# Patient Record
Sex: MALE | Race: WHITE | URBAN - METROPOLITAN AREA
[De-identification: names, ages, dates, MRNs, and addresses within clinical notes are randomized per-mention and may not be internally consistent; named-entity substitution may affect disease eponyms.]

---

## 2017-04-19 ENCOUNTER — OFFICE VISIT (OUTPATIENT)
Dept: ORTHOPEDIC SURGERY | Age: 62
End: 2017-04-19

## 2017-04-19 VITALS — BODY MASS INDEX: 28.44 KG/M2 | WEIGHT: 210 LBS | HEIGHT: 72 IN

## 2017-04-19 DIAGNOSIS — M75.02 ADHESIVE CAPSULITIS OF LEFT SHOULDER: ICD-10-CM

## 2017-04-19 DIAGNOSIS — M25.512 PAIN OF LEFT SHOULDER REGION: Primary | ICD-10-CM

## 2017-04-19 PROCEDURE — 99214 OFFICE O/P EST MOD 30 MIN: CPT | Performed by: ORTHOPAEDIC SURGERY

## 2017-04-19 PROCEDURE — 73030 X-RAY EXAM OF SHOULDER: CPT | Performed by: ORTHOPAEDIC SURGERY

## 2017-05-17 ENCOUNTER — OFFICE VISIT (OUTPATIENT)
Dept: ORTHOPEDIC SURGERY | Age: 62
End: 2017-05-17

## 2017-05-17 VITALS — HEIGHT: 72 IN | BODY MASS INDEX: 28.44 KG/M2 | WEIGHT: 210 LBS

## 2017-05-17 DIAGNOSIS — M75.02 ADHESIVE CAPSULITIS OF LEFT SHOULDER: Primary | ICD-10-CM

## 2017-05-17 PROCEDURE — 20610 DRAIN/INJ JOINT/BURSA W/O US: CPT | Performed by: PHYSICIAN ASSISTANT

## 2017-05-17 PROCEDURE — 99213 OFFICE O/P EST LOW 20 MIN: CPT | Performed by: PHYSICIAN ASSISTANT

## 2017-07-10 ENCOUNTER — OFFICE VISIT (OUTPATIENT)
Dept: ORTHOPEDIC SURGERY | Age: 62
End: 2017-07-10

## 2017-07-10 VITALS
BODY MASS INDEX: 29.12 KG/M2 | WEIGHT: 215 LBS | DIASTOLIC BLOOD PRESSURE: 69 MMHG | HEIGHT: 72 IN | SYSTOLIC BLOOD PRESSURE: 140 MMHG

## 2017-07-10 DIAGNOSIS — M25.561 RIGHT KNEE PAIN, UNSPECIFIED CHRONICITY: Primary | ICD-10-CM

## 2017-07-10 DIAGNOSIS — S83.91XA SPRAIN OF RIGHT KNEE, UNSPECIFIED LIGAMENT, INITIAL ENCOUNTER: ICD-10-CM

## 2017-07-10 DIAGNOSIS — S93.411A SPRAIN OF CALCANEOFIBULAR LIGAMENT OF RIGHT ANKLE, INITIAL ENCOUNTER: ICD-10-CM

## 2017-07-10 DIAGNOSIS — M17.11 ARTHRITIS OF RIGHT KNEE: ICD-10-CM

## 2017-07-10 DIAGNOSIS — M25.571 RIGHT ANKLE PAIN, UNSPECIFIED CHRONICITY: ICD-10-CM

## 2017-07-10 PROCEDURE — L1812 KO ELASTIC W/JOINTS PRE OTS: HCPCS | Performed by: PHYSICIAN ASSISTANT

## 2017-07-10 PROCEDURE — L1902 AFO ANKLE GAUNTLET PRE OTS: HCPCS | Performed by: PHYSICIAN ASSISTANT

## 2017-07-10 PROCEDURE — 73610 X-RAY EXAM OF ANKLE: CPT | Performed by: PHYSICIAN ASSISTANT

## 2017-07-10 PROCEDURE — 99214 OFFICE O/P EST MOD 30 MIN: CPT | Performed by: PHYSICIAN ASSISTANT

## 2017-07-10 PROCEDURE — 73564 X-RAY EXAM KNEE 4 OR MORE: CPT | Performed by: PHYSICIAN ASSISTANT

## 2017-07-21 ENCOUNTER — OFFICE VISIT (OUTPATIENT)
Dept: ORTHOPEDIC SURGERY | Age: 62
End: 2017-07-21

## 2017-07-21 ENCOUNTER — TELEPHONE (OUTPATIENT)
Dept: ORTHOPEDIC SURGERY | Age: 62
End: 2017-07-21

## 2017-07-21 VITALS
WEIGHT: 214.95 LBS | SYSTOLIC BLOOD PRESSURE: 121 MMHG | HEIGHT: 72 IN | DIASTOLIC BLOOD PRESSURE: 65 MMHG | BODY MASS INDEX: 29.11 KG/M2 | HEART RATE: 69 BPM

## 2017-07-21 DIAGNOSIS — M17.11 PRIMARY OSTEOARTHRITIS OF RIGHT KNEE: Primary | ICD-10-CM

## 2017-07-21 DIAGNOSIS — S83.241A TEAR OF MEDIAL MENISCUS OF RIGHT KNEE, CURRENT, UNSPECIFIED TEAR TYPE, INITIAL ENCOUNTER: ICD-10-CM

## 2017-07-21 DIAGNOSIS — S93.411D SPRAIN OF CALCANEOFIBULAR LIGAMENT OF RIGHT ANKLE, SUBSEQUENT ENCOUNTER: ICD-10-CM

## 2017-07-21 PROCEDURE — 99214 OFFICE O/P EST MOD 30 MIN: CPT | Performed by: ORTHOPAEDIC SURGERY

## 2017-07-21 PROCEDURE — L4361 PNEUMA/VAC WALK BOOT PRE OTS: HCPCS | Performed by: ORTHOPAEDIC SURGERY

## 2017-07-24 ENCOUNTER — TELEPHONE (OUTPATIENT)
Dept: ORTHOPEDIC SURGERY | Age: 62
End: 2017-07-24

## 2017-07-26 ENCOUNTER — OFFICE VISIT (OUTPATIENT)
Dept: ORTHOPEDIC SURGERY | Age: 62
End: 2017-07-26

## 2017-07-26 VITALS — HEIGHT: 72 IN | BODY MASS INDEX: 29.11 KG/M2 | WEIGHT: 214.95 LBS

## 2017-07-26 DIAGNOSIS — M17.11 PRIMARY OSTEOARTHRITIS OF RIGHT KNEE: Primary | ICD-10-CM

## 2017-07-26 DIAGNOSIS — S83.91XA SPRAIN OF RIGHT KNEE, UNSPECIFIED LIGAMENT, INITIAL ENCOUNTER: ICD-10-CM

## 2017-07-26 DIAGNOSIS — S93.421A SPRAIN, ANKLE JOINT, MEDIAL, RIGHT, INITIAL ENCOUNTER: ICD-10-CM

## 2017-07-26 PROBLEM — S93.429A SPRAIN, ANKLE JOINT, MEDIAL: Status: ACTIVE | Noted: 2017-07-26

## 2017-07-26 PROCEDURE — 99214 OFFICE O/P EST MOD 30 MIN: CPT | Performed by: PHYSICIAN ASSISTANT

## 2017-12-14 ENCOUNTER — TELEPHONE (OUTPATIENT)
Dept: ORTHOPEDIC SURGERY | Age: 62
End: 2017-12-14

## 2017-12-14 ENCOUNTER — OFFICE VISIT (OUTPATIENT)
Dept: ORTHOPEDIC SURGERY | Age: 62
End: 2017-12-14

## 2017-12-14 VITALS — BODY MASS INDEX: 30.77 KG/M2 | HEIGHT: 70 IN | WEIGHT: 214.95 LBS

## 2017-12-14 DIAGNOSIS — S54.12XA MEDIAN NERVE LACERATION, LEFT, INITIAL ENCOUNTER: ICD-10-CM

## 2017-12-14 DIAGNOSIS — S61.502A FLEXOR TENDON LACERATION, WRIST, OPEN WOUND, LEFT, INITIAL ENCOUNTER: Primary | ICD-10-CM

## 2017-12-14 DIAGNOSIS — S66.922A FLEXOR TENDON LACERATION, WRIST, OPEN WOUND, LEFT, INITIAL ENCOUNTER: Primary | ICD-10-CM

## 2017-12-14 DIAGNOSIS — Z01.810 PREOP CARDIOVASCULAR EXAM: Primary | ICD-10-CM

## 2017-12-14 PROCEDURE — 99215 OFFICE O/P EST HI 40 MIN: CPT | Performed by: ORTHOPAEDIC SURGERY

## 2017-12-14 NOTE — PROGRESS NOTES
Assessment: Probable partial median nerve laceration. Transection of the palmar cutaneous branch of the median nerve. Very high likelihood of at least partial flexor carpi radialis transection. Clinical exam would also suggest complete transection of flexor digitorum superficialis to the index finger. Treatment Plan: The patient and his wife are fairly apprehensive about undergoing surgery but I have discussed with them that my best recommendation would be that we schedule him for surgery for tomorrow under general anesthesia and reopen his incision to better explore the wound. We discussed risk and benefits of tendon repair as well as median nerve repair. I made sure that they understood that connecting the nerve fibers back together is not going to give him immediate return of sensation. I also tried to discuss with them the rehabilitation necessary after tendon injuries but I'm not sure that they fully understand this at the current time as they were both very apprehensive about whether or not we should go through with surgery. I have strongly encouraged him however to reexplore this wound given the physical findings    Addendum: After an extremely long conversation on multiple occasions with the patient and his wife I think they have decided not to schedule surgery currently. No Follow-up on file. Chief Complaint:  Wrist Pain (12/12/17 tape gun snapped back and hit his left wrist)      History of Present Illness  Mal Herbert is a 60 y.o. male. Patient is a gentleman who 2 days ago was using a tape gun when the tape broke and the serrated middle portion of the tape gun lacerated the volar surface of his left wrist.  Since that time he has had numbness in his thumb index and part of his middle finger. He also has complete numbness in the palm of his hand and has a burning pain in this location.   He states whenever he tries to  things with the index or middle fingers he gets a radial arterial pulses difficult to assess due to the laceration   Neurologic Exam: 2 point discrimination is completely absent and he cannot feel me touch the pulp space of the thumb either radially or ulnarly. 2 point discrimination is absent in the radial aspect of the index finger is 10 mm in the ulnar aspect of the index finger it is 7 mm in the radial aspect of the middle finger and 5 mm in the ulnar aspect of the middle finger and remaining digits. Intrinsic Muscle Strength:  Normal but difficult to assess APB function secondary to the laceration and pain  Extrinsic Muscle Strength: Normal  Special Tests:           Additional Comments:     Additional Examinations:  X-Ray Findings:    Additional Diagnostic Test Findings:    Office Procedures:    No orders of the defined types were placed in this encounter.

## 2017-12-15 LAB — GLUCOSE BLD-MCNC: 141 MG/DL (ref 70–99)

## 2017-12-18 ENCOUNTER — HOSPITAL ENCOUNTER (OUTPATIENT)
Dept: OCCUPATIONAL THERAPY | Age: 62
Discharge: HOME | End: 2017-12-31
Admitting: ORTHOPAEDIC SURGERY

## 2017-12-18 DIAGNOSIS — S54.12XD LACERATION OF LEFT MEDIAN NERVE, SUBSEQUENT ENCOUNTER: ICD-10-CM

## 2017-12-18 DIAGNOSIS — S61.512D LACERATION OF LEFT WRIST WITH TENDON INVOLVEMENT, SUBSEQUENT ENCOUNTER: Primary | ICD-10-CM

## 2017-12-18 DIAGNOSIS — S66.922D LACERATION OF LEFT WRIST WITH TENDON INVOLVEMENT, SUBSEQUENT ENCOUNTER: Primary | ICD-10-CM

## 2017-12-18 NOTE — PLAN OF CARE
in cm Index: 6  Lon  Rin  Small:5   Thumb ROM 50% flex of both joints   Thumb opposition  R:  L:not assessed   Thumb Radial/Palmar abd ROM R:  L:   Wrist ROM Ext/Flex R:  L:not assessed   Edema in cm circumf. MCPJs Moderate edema in hand and fingers   Edema in cm circumf. Wrist R:  L:    strength in lbs R:  L:   Pinch Strengthin lbs: lat  R:  L:   Pinch Strength in lbs:  3 point R:  L:     MMT:       Observations (including splints, bandages, incisions, scars): took down post op dressing, no signs of complications, sutures intact    Sensation:  [] No reported deficits  [] Intact to light touch    [] [place] Gina test completed, findings as noted:  [x] Other:thumb and index, palm numb, did report some sensation to middle and radial half of ring finger. Palpation: not tested    Functional Mobility/Transfers/Gait:  [x] Independent - no significant gait deviations  [] Assistance needed   [] Assistive device used: Falls Risk Assessment (30 days):   [x] Falls Risk assessed and no intervention required. [] Falls Risk assessed and Patient requires intervention due to being higher risk   TUG score (>12s at risk):     [] Falls education provided, including      Review Of Systems (ROS): [x]Performed Review of systems (Integumentary, CardioPulmonary, Neurological) by intake and observation. Intake form has been scanned into medical record. Patient has been instructed to contact their primary care physician regarding ROS issues if not already being addressed at this time. ASSESSMENT:   This patient presents with signs and symptoms consistent with the medical diagnosis provided by the referring physician.      Impairments (physical, cognitive and/or psychosocial):  [x] Decreased mobility   [x] Weakness    [] Hypersensitivity   [x] Pain/tenderness   [x] Edema/swelling   [x] Decreased coordination (fine/gross motor)   [] Impaired body mechanics  [x] Sensory loss  [] Loss of balance   [] Other:      Performance Deficits (to be addressed in plan of care):   [x] Bathing    [x] Household Tasks (cooking/cleaning)   [x] Dressing    [] Self Feeding   [x] Grooming    [x] Work/Education   [] Functional Mobility   [] Sleeping/Rest   [] Toileting/Hygiene   [x] Recreational Activities   [] Driving    [] Community/Social Participation   [] Other:     Rehab Potential:   [] Excellent [x] Good [] Fair  [] Poor     Barriers affecting rehab potential:  []Age    []Lack of Motivation   []Co-Morbidities  []Cognitive Function  []Environmental/home/work barriers  []Other: Tolerance of evaluation/treatment:    [] Excellent [x] Good [] Fair  [] Poor    PLAN OF CARE:  Interventions:   [x] Therapeutic Exercise [x] Therapeutic Activity    [x] Activities of Daily Living [x] Neuromuscular Re-education      [x] Patient Education  [x] Manual Therapy      [x] Modalities as needed, and not otherwise contraindicated, including: ultrasound,paraffin,moist heat/cold pack, electrical stimulation, contrast bath, iontophoresis  [] Splinting    Frequency/Duration:  3 days per week for 8 weeks    GOALS:  Short Term Goals: To be achieved in: 2 weeks  1. Independent in HEP and progression per patient tolerance, in order to prevent re-injury. 2. Patient will have a decrease in pain to facilitate improvement in movement, function, and ADLs as indicated by Functional Deficits. Long Term Goals to be achieved in 8  weeks, including patient directed goals to address identified performance deficits:  1) Pt to be independent in graded HEP progression with a good level of effort and compliance. 2) Pt to report a score of 30 % or less on the Quick DASH disability questionnaire for increased performance with carrying, moving, and handling objects. 3) Pt will demonstrate increased ROM to fingertips touching palm, thumb flex to small finger for improved ability to return to work at [employer] [place] [employer], complete desired self care.   4) Pt will demonstrate

## 2017-12-20 ENCOUNTER — HOSPITAL ENCOUNTER (OUTPATIENT)
Dept: OCCUPATIONAL THERAPY | Age: 62
Discharge: HOME | End: 2017-12-20
Admitting: ORTHOPAEDIC SURGERY

## 2017-12-20 ENCOUNTER — OFFICE VISIT (OUTPATIENT)
Dept: ORTHOPEDIC SURGERY | Age: 62
End: 2017-12-20

## 2017-12-20 DIAGNOSIS — S66.922S: ICD-10-CM

## 2017-12-20 DIAGNOSIS — S61.502S: ICD-10-CM

## 2017-12-20 DIAGNOSIS — S54.12XD LACERATION OF LEFT MEDIAN NERVE, SUBSEQUENT ENCOUNTER: Primary | ICD-10-CM

## 2017-12-20 PROBLEM — S61.502A FLEXOR TENDON LACERATION OF LEFT WRIST WITH OPEN WOUND: Status: ACTIVE | Noted: 2017-12-20

## 2017-12-20 PROBLEM — S66.922A FLEXOR TENDON LACERATION OF LEFT WRIST WITH OPEN WOUND: Status: ACTIVE | Noted: 2017-12-20

## 2017-12-20 PROCEDURE — 99024 POSTOP FOLLOW-UP VISIT: CPT | Performed by: ORTHOPAEDIC SURGERY

## 2017-12-20 RX ORDER — OXYCODONE HYDROCHLORIDE AND ACETAMINOPHEN 5; 325 MG/1; MG/1
1 TABLET ORAL EVERY 4 HOURS PRN
Qty: 40 TABLET | Refills: 0 | Status: SHIPPED | OUTPATIENT
Start: 2017-12-20 | End: 2017-12-27

## 2017-12-20 NOTE — FLOWSHEET NOTE
radial, volar wrist. Lacerated 40% of median N, FDS to index, FPL thumb. Observations (including splints, bandages, incisions, scars): took down post op dressing, no signs of complications, sutures intact      OBJECTIVE:   Date:  Hand Dominance:     [x]  Right    [] Left 2017    Objective Measures:      PAIN 4/10    Quick DASH 75%    Digit  ROM         Index    MP:  PIP:  DIP:                               Long MP:  PIP:  DIP                               Ring MP:  PIP:  DIP                               Small MP:  PIP:  DIP    Digits tip to DPFC in cm Index: 6  Lon  Rin  Small:5    Thumb ROM 50% flex of both joints    Thumb opposition  R:  L:not assessed    Thumb Radial/Palmar abd ROM R:  L:    Wrist ROM Ext/Flex R:  L:not assessed    Edema in cm circumf. MCPJs Moderate edema in hand and fingers    Edema in cm circumf. Wrist R:  L:     strength in lbs R:  L:    Pinch Strengthin lbs: lat  R:  L:    Pinch Strength in lbs:  3 point R:  L:    MMT:                 Modalities: 17    HP - Neutral forearm, wrist flexed        Therapeutic Exercise & Activities:     HEP Short range thumb and digit flex, ext only to orthosis    Prom into digit, thumb flex  5'   AROM  Short arc, gentle flex fingers, thumb   Place and hold  Gentle hold, 3x (moves well index)                                   Therapeutic Exercise and NMR EXR  [x] (44319) Provided verbal/tactile cueing for activities related to strengthening, flexibility, endurance, ROM  for improvements in scapular, scapulothoracic and UE control with self care, reaching, carrying, lifting, house/yardwork, driving/computer work. [x] (80373) Provided verbal/tactile cueing for activities related to improving balance, coordination, kinesthetic sense, posture, motor skill, proprioception  to assist with  scapular, scapulothoracic and UE control with self care, reaching, carrying, lifting, house/yardwork, driving/computer work.     Therapeutic Activities:    [] ( 98415) therapeutic activities, direct (one on one) patient contact. Use of dynamic activities to improve functional performance. Activities of Daily Living:  [] (33731) Provided self-care/home management training (i.e., activities of daily living and compensatory training, meal preparation, safety procedures, and instructions in use of assistive technology devices/adaptive equipment)     Home Exercise Program:    [] (25562) Reviewed/Progressed HEP activities related to strengthening, flexibility, endurance, ROM of scapular, scapulothoracic and UE control with self care, reaching, carrying, lifting, house/yardwork, driving/computer work    Manual Treatments:   [] (83050) Provided manual therapy to mobilize soft tissue/joints of the UE for the purpose of modulating pain, promoting relaxation,  increasing ROM, reducing/eliminating soft tissue swelling/inflammation/restriction, improving soft tissue extensibility and allowing for proper ROM for normal function with self care, reaching, carrying, lifting, house/yardwork, driving/computer work    Splinting:  [] Fabrication of: L-code  [] (98650) Checkout for orthotic/prosthetic use, established patient   [] (62740) Orthotic management and training (fitting and assessment)  [x] Comments:12/20/17  Adjusted orthosis    Charges:  Timed Code Treatment Minutes: 25   Total Treatment Minutes: 40   [x] EVAL (LOW) 02161   [] OT Re-eval (35631)  [] EVAL (MOD) 17965   [] EVAL (HIGH) 77983       [x] Daniel (95230) x  1   [] QNGBU(68247)  [x] NMR (65411) x  1   [x] Estim (attended) (09391)   [] Manual ([id number]) x       [] US (51171)  [] TA (76215) x      [] Paraffin (76077)  [] ADL  (02518) x     [] Splint/L code: Dorsal block    [] Estim (unattended) (01092)  [] Other:  [](68373) Checkout for orthotic use, established patient x       [] (85298) Orthotic mgmt & training  x        GOALS:Short Term Goals: To be achieved in: 2 weeks  1.  Independent in HEP and progression per patient tolerance, in order to prevent re-injury. 2. Patient will have a decrease in pain to facilitate improvement in movement, function, and ADLs as indicated by Functional Deficits.     Long Term Goals to be achieved in 8  weeks, including patient directed goals to address identified performance deficits:  1) Pt to be independent in graded HEP progression with a good level of effort and compliance. 2) Pt to report a score of 30 % or less on the Quick DASH disability questionnaire for increased performance with carrying, moving, and handling objects. 3) Pt will demonstrate increased ROM to fingertips touching palm, thumb flex to small finger for improved ability to return to work at [employer], complete desired self care. 4) Pt will demonstrate increased strength to  50% of right for improved ability to  golf club, resume job duties. 5) Pt will have a decrease in pain to 2/10 to facilitate improvement in ability to return to work at [employer], complete desired self care.       Progression Towards Functional goals:  [x] Patient is progressing as expected towards functional goals listed. [] Progression is slowed due to complexities listed. [] Progression has been slowed due to co-morbidities. [] Plan just implemented, too soon to assess goals progression  [] Other:     ASSESSMENT:  Very good digit and thumb flex, cautioned patient to hold back on active gripping    Treatment/Activity Tolerance:  [] Patient tolerated treatment well [] Patient limited by fatique  [] Patient limited by pain  [] Patient limited by other medical complications  [] Other:     Prognosis: [x] Good [] Fair  [] Poor    Patient Requires Follow-up: [x] Yes  [] No    PLAN: Recommend Occupational Therapy 3 times a week for 8 weeks  [x] Continue per plan of care [] Alter current plan (see comments)  [] Plan of care initiated [] Hold pending MD visit [] Discharge    Plan for next session: adjust orthosis as needed. , tendon repair protocol, early active motion.  Pain control   Thermal modalities, functional activities and strengthening as inicated, AROM, PROM as indicated    Electronically signed by: Nicolasa Harden OTR/L, [address] [facility] [id number]

## 2017-12-20 NOTE — PROGRESS NOTES
Assessment: Excellent early result from repair of flexor pollicis longus. Flexor digitorum superficialis to the index finger. Flexor carpi radialis. Partial median nerve laceration. And ligation of the radial artery    Treatment Plan: Patient has begun early active range of motion therapy. I instructed him on continuing with his therapy. It is acceptable to go ahead and remove his sutures and his therapy visit next Tuesday. I will follow-up in 3 weeks unless there are any problems in the interim    Return in about 3 weeks (around 1/10/2018). History of Present Illness  Jaja Palmer is a 60 y.o. male. 1st postop visit    Review of Systems  Complete Review of Systems performed and is non-contributory except for what is noted in HPI. Vital Signs  There were no vitals filed for this visit. There is no height or weight on file to calculate BMI. Physical Exam  Constitutional:  Patient is well-nourished and demonstrates normal hygiene. Mental Status:  Patient is alert and oriented to person, place and time. Skin:  Intact, no rashes or lesions. Hand Examination: Wounds are healing nicely good pull-through of the repaired tendons. Patient is a dorsal extension block splint    Additional Comments:     Additional Examinations:  X-Ray Findings:    Additional Diagnostic Test Findings:    Office Procedures:    No orders of the defined types were placed in this encounter.

## 2017-12-20 NOTE — LETTER
[place] [address]  Phone: [phone]  Fax: [phone]    Lorrain Lennox, MD        December 20, 2017     Patient: Michael Woodard   YOB: 1955   Date of Visit: 12/20/2017       To Whom It May Concern: It is my medical opinion that Michael Woodard should remain out of work until 3/1/2018 due to hand/wrist surgery. If you have any questions or concerns, please don't hesitate to call.     Sincerely,          Lorrain Lennox, MD

## 2017-12-22 ENCOUNTER — HOSPITAL ENCOUNTER (OUTPATIENT)
Dept: OCCUPATIONAL THERAPY | Age: 62
Discharge: HOME | End: 2017-12-22
Admitting: ORTHOPAEDIC SURGERY

## 2017-12-22 NOTE — FLOWSHEET NOTE
intact. Background/Relevant Medical & Therapy History:  Was using a tape gun to wrap Sera forrester, tape broke and momentum of the movement cut his radial, volar wrist. Lacerated 40% of median N, FDS to index, FPL thumb. Observations (including splints, bandages, incisions, scars): took down post op dressing, no signs of complications, sutures intact      OBJECTIVE:   Date:  Hand Dominance:     [x]  Right    [] Left 2017     Objective Measures:      PAIN 4/10    Quick DASH 75%    Digit  ROM         Index    MP:  PIP:  DIP:                               Long MP:  PIP:  DIP                               Ring MP:  PIP:  DIP                               Small MP:  PIP:  DIP    Digits tip to DPFC in cm Index: 6  Lon  Rin  Small:5 All fingers touch palm   Thumb ROM 50% flex of both joints MP: /45  IP: /45   Thumb opposition  R:  L:not assessed    Thumb Radial/Palmar abd ROM R:  L:    Wrist ROM Ext/Flex R:  L:not assessed    Edema in cm circumf. MCPJs Moderate edema in hand and fingers Thumb, index and middle mostly   Edema in cm circumf. Wrist R:  L:     strength in lbs R:  L:    Pinch Strengthin lbs: lat  R:  L:    Pinch Strength in lbs:  3 point R:  L:    MMT:                 Modalities: 17    HP - Neutral forearm, wrist flexed    HP+ stim   IFC in area around incision + HP   Therapeutic Exercise & Activities:      HEP Short range thumb and digit flex, ext only to orthosis     Prom into digit, thumb flex  5' Prom fingers   AROM  Short arc, gentle flex fingers, thumb same   Place and hold  Gentle hold, 3x (moves well index)                                          Therapeutic Exercise and NMR EXR  [x] (12267) Provided verbal/tactile cueing for activities related to strengthening, flexibility, endurance, ROM  for improvements in scapular, scapulothoracic and UE control with self care, reaching, carrying, lifting, house/yardwork, driving/computer work.     [] (18606) Provided verbal/tactile cueing for activities related to improving balance, coordination, kinesthetic sense, posture, motor skill, proprioception  to assist with  scapular, scapulothoracic and UE control with self care, reaching, carrying, lifting, house/yardwork, driving/computer work. Therapeutic Activities:    [] ( 08369) therapeutic activities, direct (one on one) patient contact. Use of dynamic activities to improve functional performance.     Activities of Daily Living:  [] (69669) Provided self-care/home management training (i.e., activities of daily living and compensatory training, meal preparation, safety procedures, and instructions in use of assistive technology devices/adaptive equipment)     Home Exercise Program:    [] (51454) Reviewed/Progressed HEP activities related to strengthening, flexibility, endurance, ROM of scapular, scapulothoracic and UE control with self care, reaching, carrying, lifting, house/yardwork, driving/computer work    Manual Treatments: mobs to proximal forearm flexors   [x] ([id number]) Provided manual therapy to mobilize soft tissue/joints of the UE for the purpose of modulating pain, promoting relaxation,  increasing ROM, reducing/eliminating soft tissue swelling/inflammation/restriction, improving soft tissue extensibility and allowing for proper ROM for normal function with self care, reaching, carrying, lifting, house/yardwork, driving/computer work    Splinting:  [] Fabrication of: L-code  [] (17127) Checkout for orthotic/prosthetic use, established patient   [] (13731) Orthotic management and training (fitting and assessment)  [] Comments:12/20/17  Adjusted orthosis    Charges:  Timed Code Treatment Minutes: 30   Total Treatment Minutes: 40   [x] EVAL (LOW) 73398   [] OT Re-eval (41128)  [] EVAL (MOD) 32907   [] EVAL (HIGH) 01484       [x] Daniel (91726) x  1   [] ENWXS(95760)  [] NMR (67915) x  1   [x] Estim (attended) (53137)   [x] Manual ([id number]) x       [] [address] [facility] ([address])  [] TA (62396) x      [] Paraffin (13226)  [] ADL  (85951) x     [] Splint/L code: Dorsal block    [] Estim (unattended) (62858)  [] Other:  [](59506) Checkout for orthotic use, established patient x       [] (11083) Orthotic mgmt & training  x        GOALS:Short Term Goals: To be achieved in: 2 weeks  1. Independent in HEP and progression per patient tolerance, in order to prevent re-injury. 2. Patient will have a decrease in pain to facilitate improvement in movement, function, and ADLs as indicated by Functional Deficits.     Long Term Goals to be achieved in 8  weeks, including patient directed goals to address identified performance deficits:  1) Pt to be independent in graded HEP progression with a good level of effort and compliance. 2) Pt to report a score of 30 % or less on the Quick DASH disability questionnaire for increased performance with carrying, moving, and handling objects. 3) Pt will demonstrate increased ROM to fingertips touching palm, thumb flex to small finger for improved ability to return to work at [employer], complete desired self care. 4) Pt will demonstrate increased strength to  50% of right for improved ability to  golf club, resume job duties. 5) Pt will have a decrease in pain to 2/10 to facilitate improvement in ability to return to work at [employer], complete desired self care.     Progression Towards Functional goals:  [x] Patient is progressing as expected towards functional goals listed. [] Progression is slowed due to complexities listed. [] Progression has been slowed due to co-morbidities.   [] Plan just implemented, too soon to assess goals progression  [] Other:     ASSESSMENT:  Continues to have very good digit and thumb flex, cautioned patient to hold back on active gripping    Treatment/Activity Tolerance:  [x] Patient tolerated treatment well [] Patient limited by fatique  [] Patient limited by pain  [] Patient limited by other medical complications  [] Other:     Prognosis: [x] Good [] Fair  [] Poor    Patient Requires Follow-up: [x] Yes  [] No    PLAN: Recommend Occupational Therapy 3 times a week for 8 weeks  [x] Continue per plan of care [] Alter current plan (see comments)  [] Plan of care initiated [] Hold pending MD visit [] Discharge    Plan for next session: adjust orthosis as needed. , tendon repair protocol, early active motion.  Pain control   Thermal modalities, functional activities and strengthening as inicated, AROM, PROM as indicated    Electronically signed by: Abundio Melissa/L, [address] [place] [address] [id number]

## 2017-12-26 ENCOUNTER — HOSPITAL ENCOUNTER (OUTPATIENT)
Dept: OCCUPATIONAL THERAPY | Age: 62
Discharge: HOME | End: 2017-12-26
Admitting: ORTHOPAEDIC SURGERY

## 2017-12-26 NOTE — FLOWSHEET NOTE
Therapy History:  Was using a tape gun to wrap Sera forrester, tape broke and momentum of the movement cut his radial, volar wrist. Lacerated 40% of median N, FDS to index, FPL thumb. Observations (including splints, bandages, incisions, scars): took down post op dressing, no signs of complications, sutures intact      OBJECTIVE:   Date:  Hand Dominance:     [x]  Right    [] Left 2017     Objective Measures:      PAIN 4/10    Quick DASH 75%    Digit  ROM         Index    MP:  PIP:  DIP:                               Long MP:  PIP:  DIP                               Ring MP:  PIP:  DIP                               Small MP:  PIP:  DIP    Digits tip to DPFC in cm Index: 6  Lon  Rin  Small:5 All fingers touch palm   Thumb ROM 50% flex of both joints MP: /45  IP: /45   Thumb opposition  R:  L:not assessed    Thumb Radial/Palmar abd ROM R:  L:    Wrist ROM Ext/Flex R:  L:not assessed    Edema in cm circumf. MCPJs Moderate edema in hand and fingers Thumb, index and middle mostly   Edema in cm circumf.   Wrist R:  L:     strength in lbs R:  L:    Pinch Strengthin lbs: lat  R:  L:    Pinch Strength in lbs:  3 point R:  L:    MMT:                 Modalities: 17   HP - Neutral forearm, wrist flexed  HP with fingers, thumb and wrist passively flexed and forearm in neutral   HP+ stim   IFC in area around incision + HP IFC around wrist incision end of treatment   Therapeutic Exercise & Activities:       HEP Short range thumb and digit flex, ext only to orthosis      Prom into digit, thumb flex  5' Prom fingers PROM to fingers thumb and wrist into flexion and protected extension   AROM  Short arc, gentle flex fingers, thumb same Short arc active flexion to fingers and thumb   Place and hold  Gentle hold, 3x (moves well index)     Wound/scar care    Removed stitches today, massaged incision with vaseline and applied light gauze dressing Therapeutic Exercise and NMR EXR  [x] (95911) Provided verbal/tactile cueing for activities related to strengthening, flexibility, endurance, ROM  for improvements in scapular, scapulothoracic and UE control with self care, reaching, carrying, lifting, house/yardwork, driving/computer work.    [] (80874) Provided verbal/tactile cueing for activities related to improving balance, coordination, kinesthetic sense, posture, motor skill, proprioception  to assist with  scapular, scapulothoracic and UE control with self care, reaching, carrying, lifting, house/yardwork, driving/computer work. Therapeutic Activities:    [] ( 73139) therapeutic activities, direct (one on one) patient contact. Use of dynamic activities to improve functional performance.     Activities of Daily Living:  [] (76702) Provided self-care/home management training (i.e., activities of daily living and compensatory training, meal preparation, safety procedures, and instructions in use of assistive technology devices/adaptive equipment)     Home Exercise Program:    [] (24650) Reviewed/Progressed HEP activities related to strengthening, flexibility, endurance, ROM of scapular, scapulothoracic and UE control with self care, reaching, carrying, lifting, house/yardwork, driving/computer work    Manual Treatments: mobs to proximal forearm flexors   [x] ([id number]) Provided manual therapy to mobilize soft tissue/joints of the UE for the purpose of modulating pain, promoting relaxation,  increasing ROM, reducing/eliminating soft tissue swelling/inflammation/restriction, improving soft tissue extensibility and allowing for proper ROM for normal function with self care, reaching, carrying, lifting, house/yardwork, driving/computer work    Splinting:  [] Fabrication of: L-code  [] (75887) Checkout for orthotic/prosthetic use, established patient   [] (91058) Orthotic management and training (fitting and assessment)  [] Comments:12/20/17  Adjusted progression  [] Other:     ASSESSMENT:  Very good early AROM, continued to warn patient about to large of arc of motion and no rodrigo of hand for gripping or pinching. Treatment/Activity Tolerance:  [x] Patient tolerated treatment well [] Patient limited by fatique  [] Patient limited by pain  [] Patient limited by other medical complications  [] Other:     Prognosis: [x] Good [] Fair  [] Poor    Patient Requires Follow-up: [x] Yes  [] No    PLAN: Recommend Occupational Therapy 3 times a week for 8 weeks  [x] Continue per plan of care [] Alter current plan (see comments)  [] Plan of care initiated [] Hold pending MD visit [] Discharge    Plan for next session: adjust orthosis as needed. , tendon repair protocol, early active motion.  Pain control   Thermal modalities, functional activities and strengthening as inicated, AROM, PROM as indicated    Electronically signed by: Mily Jay Mt, [address] [place] [phone]

## 2017-12-28 ENCOUNTER — HOSPITAL ENCOUNTER (OUTPATIENT)
Dept: OCCUPATIONAL THERAPY | Age: 62
Discharge: HOME | End: 2017-12-28
Admitting: ORTHOPAEDIC SURGERY

## 2017-12-28 NOTE — FLOWSHEET NOTE
[facility], [address] [place]  Phone: [phone]  Fax [phone]  Hand Therapy Daily Treatment Note  Date:  2017    Patient: Suma Jackson   : 1955   MRN: [id number]  Referring Physician: Referring Practitioner: Jeff Hamilton       Medical Diagnosis Information:   Diagnosis: S61.512D, Q05.367A (ICD-10-CM) - Laceration of left wrist with tendon involvement, S54.12XD (ICD-10-CM) - Laceration of left median nerve; 12/15/17- Repair Left median nerve @ wrist level; Repair flexor digitorum superficialis; Repair flexor pollicus longus; Repair flexor carpi radialis;  S64.1 injury median nerve at wrist Treatment Diagnosis: Left hand stiffness M25.642     Date of injury:17  Date of Surgery/Type of procedure:   12/15/17     Repair Left median nerve @ wrist level; Repair flexor digitorum superficialis; Repair flexor pollicus longus; Repair flexor carpi radialis; Insurance information:OT Insurance Information: BCBS   Comorbidities Affecting Functional Performance:     []Anxiety (F41.9)/Depression (F32.9)   []Diabetes Type 1(E10.65) or 2 (E11.65)   []Rheumatoid Arthritis (M05.9)  []Fibromyalgia (M79.7)  []Neuropathy(G60.9)  []Osteoarthritis(M19.91)  [x]None   []Other:    G-code:      Date of Patient follow up with Physician:  Preferred Language for Healthcare:   [x]English       []other:  Latex Allergy:  [x]NO      []YES  RESTRICTIONS/PRECAUTIONS:  Protect flexor tendons and nerve repair  Progress Note: [x]  Yes  []  No  Next due by : Visit #10       Visit # Insurance Allowable Requires auth   5     no:[]                  yes:[]      Pain level:4 /10     SUBJECTIVE:  Sutures were removed earlier in the week, no sign of wound complication.   Background/Relevant Medical & Therapy History:  Was using a tape gun to wrap [place] presents, tape broke and momentum of the movement cut his radial, volar wrist. Lacerated 40% of median N, FDS to index, FPL thumb. Observations (including splints, bandages, incisions, scars): took down post op dressing, no signs of complications, sutures intact      OBJECTIVE:   Date:  Hand Dominance:     [x]  Right    [] Left 2017     Objective Measures:      PAIN 4/10    Quick DASH 75%    Digit  ROM         Index    MP:  PIP:  DIP:                               Long MP:  PIP:  DIP                               Ring MP:  PIP:  DIP                               Small MP:  PIP:  DIP    Digits tip to DPFC in cm Index: 6  Lon  Rin  Small:5 All fingers touch palm   Thumb ROM 50% flex of both joints MP: /45  IP: /45   Thumb opposition  R:  L:not assessed    Thumb Radial/Palmar abd ROM R:  L:    Wrist ROM Ext/Flex R:  L:not assessed    Edema in cm circumf. MCPJs Moderate edema in hand and fingers Thumb, index and middle mostly   Edema in cm circumf.   Wrist R:  L:     strength in lbs R:  L:    Pinch Strengthin lbs: lat  R:  L:    Pinch Strength in lbs:  3 point R:  L:    MMT:                 Modalities: 17    HP - Neutral forearm, wrist flexed  HP with fingers, thumb and wrist passively flexed and forearm in neutral HP hand flat   HP+ stim   IFC in area around incision + HP IFC around wrist incision end of treatment    Therapeutic Exercise & Activities:        HEP Short range thumb and digit flex, ext only to orthosis       Prom into digit, thumb flex  5' Prom fingers PROM to fingers thumb and wrist into flexion and protected extension Prom protecting flexor tendons   AROM  Short arc, gentle flex fingers, thumb same Short arc active flexion to fingers and thumb Gentle active flexion, index is lagging but still close to touching palm-use foam as goal   Place and hold  Gentle hold, 3x (moves well index)      Wound/scar care    Removed stitches today, massaged incision with vaseline and applied light gauze soon to assess goals progression  [] Other:     ASSESSMENT:  Very good early AROM, continued to progress patient slowly into active flexion, no extension beyond neutral, still no use of hand for gripping or pinching. Treatment/Activity Tolerance:  [x] Patient tolerated treatment well [] Patient limited by fatique  [] Patient limited by pain  [] Patient limited by other medical complications  [] Other:     Prognosis: [x] Good [] Fair  [] Poor    Patient Requires Follow-up: [x] Yes  [] No    PLAN: Recommend Occupational Therapy, decreasing to 1x per week for 6 weeks has excellent early motion and is doing HEP  [] Continue per plan of care [x] Alter current plan (see comments)  [] Plan of care initiated [] Hold pending MD visit [] Discharge    Plan for next session: adjust orthosis as needed. , tendon repair protocol, early active motion.  Pain control   Thermal modalities, functional activities and strengthening as inicated, AROM, PROM as indicated    Electronically signed by: Madyson Flaherty , [id number], [address] [place] [id number]

## 2017-12-29 ENCOUNTER — TELEPHONE (OUTPATIENT)
Dept: ORTHOPEDIC SURGERY | Age: 62
End: 2017-12-29

## 2018-01-01 ENCOUNTER — HOSPITAL ENCOUNTER (OUTPATIENT)
Dept: OCCUPATIONAL THERAPY | Age: 63
Discharge: HOME | End: 2018-01-31
Attending: ORTHOPAEDIC SURGERY | Admitting: ORTHOPAEDIC SURGERY

## 2018-01-08 ENCOUNTER — OFFICE VISIT (OUTPATIENT)
Dept: ORTHOPEDIC SURGERY | Age: 63
End: 2018-01-08

## 2018-01-08 VITALS — HEIGHT: 70 IN | WEIGHT: 214.95 LBS | BODY MASS INDEX: 30.77 KG/M2

## 2018-01-08 DIAGNOSIS — S54.12XD INJURY OF MEDIAN NERVE AT LEFT FOREARM LEVEL, SUBSEQUENT ENCOUNTER: Primary | ICD-10-CM

## 2018-01-08 DIAGNOSIS — S61.512D LACERATION OF LEFT WRIST WITH TENDON INVOLVEMENT, SUBSEQUENT ENCOUNTER: ICD-10-CM

## 2018-01-08 DIAGNOSIS — S66.922D LACERATION OF LEFT WRIST WITH TENDON INVOLVEMENT, SUBSEQUENT ENCOUNTER: ICD-10-CM

## 2018-01-08 PROCEDURE — 99024 POSTOP FOLLOW-UP VISIT: CPT | Performed by: ORTHOPAEDIC SURGERY

## 2018-01-09 ENCOUNTER — TELEPHONE (OUTPATIENT)
Dept: ORTHOPEDIC SURGERY | Age: 63
End: 2018-01-09

## 2018-01-09 NOTE — TELEPHONE ENCOUNTER
Faxed completed APS form (L hand/wrist) to Lona @ [phone]. Supporting documents faxed: RTW note dated 12/20/17, OP report dated 12/15/17, and OV notes dated 12/14/17, 12/20/17 and 1/8/18.

## 2018-01-10 ENCOUNTER — HOSPITAL ENCOUNTER (OUTPATIENT)
Dept: OCCUPATIONAL THERAPY | Age: 63
Discharge: HOME | End: 2018-01-10
Admitting: ORTHOPAEDIC SURGERY

## 2018-01-10 NOTE — FLOWSHEET NOTE
mobs  [x] ([id number]) Provided manual therapy to mobilize soft tissue/joints of the UE for the purpose of modulating pain, promoting relaxation,  increasing ROM, reducing/eliminating soft tissue swelling/inflammation/restriction, improving soft tissue extensibility and allowing for proper ROM for normal function with self care, reaching, carrying, lifting, house/yardwork, driving/computer work    Splinting:  [] Fabrication of: L-code  [] (80771) Checkout for orthotic/prosthetic use, established patient   [] (37181) Orthotic management and training (fitting and assessment)  [] Comments:12/20/17  Adjusted orthosis    Charges:  Timed Code Treatment Minutes: 38   Total Treatment Minutes: 10   [] EVAL (LOW) 65275   [] OT Re-eval (80273)  [] EVAL (MOD) 24620   [] EVAL (HIGH) 74938       [x] Daniel (93902) x  2   [] RQWXB(84489)  [] NMR (51798) x      [] Estim (attended) (64779)   [x] Manual ([id number]) x  1    [] US (36614)  [] TA (40735) x      [] Paraffin (09283)  [] ADL  (81088) x     [] Splint/L code: Dorsal block    [] Estim (unattended) (64527)  [] Other:  [](39358) Checkout for orthotic use, established patient x       [] (77513) Orthotic mgmt & training  x        GOALS:Short Term Goals: To be achieved in: 2 weeks  1. Independent in HEP and progression per patient tolerance, in order to prevent re-injury. 2. Patient will have a decrease in pain to facilitate improvement in movement, function, and ADLs as indicated by Functional Deficits.     Long Term Goals to be achieved in 8  weeks, including patient directed goals to address identified performance deficits:  1) Pt to be independent in graded HEP progression with a good level of effort and compliance. 2) Pt to report a score of 30 % or less on the Quick DASH disability questionnaire for increased performance with carrying, moving, and handling objects.   3) Pt will demonstrate increased ROM to fingertips touching palm, thumb flex to small finger for improved ability to return to work at [employer], complete desired self care. 4) Pt will demonstrate increased strength to  50% of right for improved ability to  golf club, resume job duties. 5) Pt will have a decrease in pain to 2/10 to facilitate improvement in ability to return to work at [employer], complete desired self care.     Progression Towards Functional goals:  [x] Patient is progressing as expected towards functional goals listed. [] Progression is slowed due to complexities listed. [] Progression has been slowed due to co-morbidities. [] Plan just implemented, too soon to assess goals progression  [] Other:     ASSESSMENT:      Treatment/Activity Tolerance:  [x] Patient tolerated treatment well [] Patient limited by fatique  [] Patient limited by pain  [] Patient limited by other medical complications  [] Other:     Prognosis: [x] Good [] Fair  [] Poor    Patient Requires Follow-up: [x] Yes  [] No    PLAN: Recommend Occupational Therapy, continue 1x per week for 5 weeks has excellent early motion and is doing HEP  [x] Continue per plan of care [] Alter current plan (see comments)  [] Plan of care initiated [] Hold pending MD visit [] Discharge    Plan for next session: adjust orthosis as needed. , tendon repair protocol, early active motion.  Pain control   Thermal modalities, functional activities and strengthening as inicated, AROM, PROM as indicated    Electronically signed by: Wandra Linger , Malcom Olszewski, [address] [place] [address] -[id number]

## 2018-01-17 ENCOUNTER — HOSPITAL ENCOUNTER (OUTPATIENT)
Dept: OCCUPATIONAL THERAPY | Age: 63
Discharge: HOME | End: 2018-01-17
Admitting: ORTHOPAEDIC SURGERY

## 2018-01-17 NOTE — FLOWSHEET NOTE
[facility], [address]  Phone: [phone]  Fax [phone]  Hand Therapy Daily Treatment Note  Date:  2018    Patient: Mychal Rock   : 1955   MRN: [id number]  Referring Physician: Referring Practitioner: Timoteo Casey       Medical Diagnosis Information:   Diagnosis: S61.512D, X74.306U (ICD-10-CM) - Laceration of left wrist with tendon involvement, S54.12XD (ICD-10-CM) - Laceration of left median nerve; 12/15/17- Repair Left median nerve @ wrist level; Repair flexor digitorum superficialis; Repair flexor pollicus longus; Repair flexor carpi radialis;  S64.1 injury median nerve at wrist Treatment Diagnosis: Left hand stiffness M25.642     Date of injury:17  Date of Surgery/Type of procedure:   12/15/17     Repair Left median nerve @ wrist level; Repair flexor digitorum superficialis; Repair flexor pollicus longus; Repair flexor carpi radialis; Insurance information:OT Insurance Information: BCBS   Comorbidities Affecting Functional Performance:     []Anxiety (F41.9)/Depression (F32.9)   []Diabetes Type 1(E10.65) or 2 (E11.65)   []Rheumatoid Arthritis (M05.9)  []Fibromyalgia (M79.7)  []Neuropathy(G60.9)  []Osteoarthritis(M19.91)  [x]None   []Other:    G-code:      Date of Patient follow up with Physician: 18Preferred Language for Healthcare:   [x]English       []other:  Latex Allergy:  [x]NO      []YES  RESTRICTIONS/PRECAUTIONS:  Protect flexor tendons and nerve repair  Progress Note: []  Yes  [x]  No  Next due by : Visit #10       Visit # Insurance Allowable Requires auth   7     no:[]                  yes:[]      Pain level: 2/10     SUBJECTIVE:  Has pain in area of incision with making a fist. 6 weeks post op on 18. Will add greater active functional use to plan. Complains that left shoulder is getting sore.    1/10/17 Reports he saw md is to follow

## 2018-01-24 ENCOUNTER — HOSPITAL ENCOUNTER (OUTPATIENT)
Dept: OCCUPATIONAL THERAPY | Age: 63
Discharge: HOME | End: 2018-01-24
Admitting: ORTHOPAEDIC SURGERY

## 2018-01-24 NOTE — FLOWSHEET NOTE
GOALS:Short Term Goals: To be achieved in: 2 weeks  1. Independent in HEP and progression per patient tolerance, in order to prevent re-injury. 2. Patient will have a decrease in pain to facilitate improvement in movement, function, and ADLs as indicated by Functional Deficits.     Long Term Goals to be achieved in 8  weeks, including patient directed goals to address identified performance deficits:  1) Pt to be independent in graded HEP progression with a good level of effort and compliance. 2) Pt to report a score of 30 % or less on the Quick DASH disability questionnaire for increased performance with carrying, moving, and handling objects. 3) Pt will demonstrate increased ROM to fingertips touching palm, thumb flex to small finger for improved ability to return to work at [employer], complete desired self care. 4) Pt will demonstrate increased strength to  50% of right for improved ability to  golf club, resume job duties. 5) Pt will have a decrease in pain to 2/10 to facilitate improvement in ability to return to work at [employer], complete desired self care.     Progression Towards Functional goals:  [x] Patient is progressing as expected towards functional goals listed. [] Progression is slowed due to complexities listed. [] Progression has been slowed due to co-morbidities.   [] Plan just implemented, too soon to assess goals progression  [] Other:     ASSESSMENT:      Treatment/Activity Tolerance:  [x] Patient tolerated treatment well [] Patient limited by fatique  [] Patient limited by pain  [] Patient limited by other medical complications  [] Other:     Prognosis: [x] Good [] Fair  [] Poor    Patient Requires Follow-up: [x] Yes  [] No    PLAN: Recommend Occupational Therapy, continue 1x per week for 5 weeks has excellent early motion and is doing HEP  [x] Continue per plan of care [] Alter current plan (see comments)  [] Plan of care initiated [] Hold pending MD visit [] Discharge    Plan for next session: doing more functional activities with left hand, is tolerating gradual progression of ROM and functional activities.    Thermal modalities, functional activities and strengthening as inicated, AROM, PROM as indicated    Electronically signed by: Sonia Maldonado , [address], [address] [facility] -[id number]

## 2018-01-29 ENCOUNTER — OFFICE VISIT (OUTPATIENT)
Dept: ORTHOPEDIC SURGERY | Age: 63
End: 2018-01-29

## 2018-01-29 DIAGNOSIS — S66.922S: Primary | ICD-10-CM

## 2018-01-29 DIAGNOSIS — S61.502S: Primary | ICD-10-CM

## 2018-01-29 PROCEDURE — 99024 POSTOP FOLLOW-UP VISIT: CPT | Performed by: ORTHOPAEDIC SURGERY

## 2018-01-31 ENCOUNTER — HOSPITAL ENCOUNTER (OUTPATIENT)
Dept: OCCUPATIONAL THERAPY | Age: 63
Discharge: HOME | End: 2018-02-01
Admitting: ORTHOPAEDIC SURGERY

## 2018-01-31 ENCOUNTER — TELEPHONE (OUTPATIENT)
Dept: ORTHOPEDIC SURGERY | Age: 63
End: 2018-01-31

## 2018-02-01 ENCOUNTER — HOSPITAL ENCOUNTER (OUTPATIENT)
Dept: OCCUPATIONAL THERAPY | Age: 63
Discharge: HOME | End: 2018-02-28
Attending: ORTHOPAEDIC SURGERY | Admitting: ORTHOPAEDIC SURGERY

## 2018-02-14 ENCOUNTER — HOSPITAL ENCOUNTER (OUTPATIENT)
Dept: OCCUPATIONAL THERAPY | Age: 63
Discharge: HOME | End: 2018-02-15
Admitting: ORTHOPAEDIC SURGERY

## 2018-02-14 NOTE — FLOWSHEET NOTE
Yes  []  No  Next due by : Visit #10       Visit # Insurance Allowable Requires auth   10     no:[]                  yes:[]      Pain level: 2/10     SUBJECTIVE: Patient reports he burned the tip of his thumb a couple of days ago and was not aware of it. Does have a approx 1 cm area with evidence of a blister. No signs of infection noted today. He also wants to be able to  his thermal cup, web space is too tight to do so currently. 18 Reports today that he has history of left shoulder stiffness and had an incident of feeling pain in area of pec when doing his theraband. He reports he is starting to get sensation back in his index. Thumb is still numb. 6 weeks post op on 18. Will add greater active functional use to plan. Complains that left shoulder is getting sore. 1/10/17 Reports he saw md is to follow up again on  18 and will likely wean out of orthosis at that time. Background/Relevant Medical & Therapy History:  Was using a tape gun to wrap [place] presents, tape broke and momentum of the movement cut his radial, volar wrist. Lacerated 40% of median N, FDS to index, FPL thumb.  Observations (including splints, bandages, incisions, scars): took down post op dressing, no signs of complications, sutures intact      OBJECTIVE:   Date:  Hand Dominance:     [x]  Right    [] Left 2017 12/22/17   1/10/18  1/31/18  2/14/18    Objective Measures:         PAIN 4/10  2/10  010   Quick DASH 75%    57%   Digit  ROM         Index   /80  /97/50 MP:  PIP:  DIP:  /75  /90  /60                                Long MP:  PIP:  DIP                                  Ring MP:  PIP:  DIP                                  Small MP:  PIP:  DIP       Digits tip to DPFC in cm Index: 6  Lon  Rin  Small:5 All fingers touch palm      Thumb ROM 50% flex of both joints MP: /45  IP: /45      Thumb opposition  R:  L:not assessed       Thumb Radial/Palmar abd ROM R:  L:       Wrist ROM Ext/Flex R:  L:not HEP-flex against soft foam, blocking to PIP, yellow pinch pin against palm using index and middle Instructed in yellow t band for scap squeeze, biceps, triceps (very light resist, pt told not to  and pull hard on band or anything yet.)   and feed one at a time            Corner stretch   Re-eval                                            Therapeutic Exercise and NMR EXR  [x] (08342) Provided verbal/tactile cueing for activities related to strengthening, flexibility, endurance, ROM  for improvements in scapular, scapulothoracic and UE control with self care, reaching, carrying, lifting, house/yardwork, driving/computer work.    [] (14713) Provided verbal/tactile cueing for activities related to improving balance, coordination, kinesthetic sense, posture, motor skill, proprioception  to assist with  scapular, scapulothoracic and UE control with self care, reaching, carrying, lifting, house/yardwork, driving/computer work. Therapeutic Activities:  Fine motor manipulation  [] ( 80908) therapeutic activities, direct (one on one) patient contact. Use of dynamic activities to improve functional performance.     Activities of Daily Living:  [] (53359) Provided self-care/home management training (i.e., activities of daily living and compensatory training, meal preparation, safety procedures, and instructions in use of assistive technology devices/adaptive equipment)     Home Exercise Program:  Gave green band to progress, is allowed to  and pull now 1/31/18 Light tband for scap, elbow 1/17/18   [] (26295) Reviewed/Progressed HEP activities related to strengthening, flexibility, endurance, ROM of scapular, scapulothoracic and UE control with self care, reaching, carrying, lifting, house/yardwork, driving/computer work    Manual Treatments: scar mobs, forearm mobs-cupping to volar, distal forearm and scars   [x] ([id number]) Provided manual therapy to mobilize soft tissue/joints of the UE for the purpose of to return to work at [address] [employer], complete desired self care.-met  4) Pt will demonstrate increased strength to  50% of right for improved ability to  golf club, resume job duties. -not met  5) Pt will have a decrease in pain to 2/10 to facilitate improvement in ability to return to work at [address] [employer], complete desired self care.-met     Progression Towards Functional goals:  [x] Patient is progressing as expected towards functional goals listed. [] Progression is slowed due to complexities listed. [] Progression has been slowed due to co-morbidities. [] Plan just implemented, too soon to assess goals progression  [x] Other: is functionally limited in use of hand by nubmness in part of median nerve distribution of left hand    ASSESSMENT:  Still making excellent progress, limited by numbness, first web space has loss of motion, limiting function as does hand numbness    Treatment/Activity Tolerance:  [x] Patient tolerated treatment well [] Patient limited by fatique  [] Patient limited by pain  [] Patient limited by other medical complications  [] Other:     Prognosis: [x] Good [] Fair  [] Poor    Patient Requires Follow-up: [x] Yes  [] No    PLAN: Recommend Occupational Therapy, continue 1x per week for 4 additional weeks     [x] Continue per plan of care [] Alter current plan (see comments)  [] Plan of care initiated [] Hold pending MD visit [] Discharge    Plan for next session: Modify web space orthosis as needed. Strengthen progressively, add more functional activities with left hand, is tolerating gradual progression of ROM and functional activities.    Thermal modalities, functional activities and strengthening as inicated, AROM, PROM as indicated    Electronically signed by: Zenaida Morrow , [address], [address] [facility] -[id number]

## 2018-02-21 ENCOUNTER — HOSPITAL ENCOUNTER (OUTPATIENT)
Dept: OCCUPATIONAL THERAPY | Age: 63
Discharge: HOME | End: 2018-02-22
Admitting: ORTHOPAEDIC SURGERY

## 2018-02-21 NOTE — FLOWSHEET NOTE
allowing better healing. Is not able to tolerate wearing the web space orthosis as much as suggested, he does think it has helped to increase radial abduction of the thumb. He brought in his cupping tool kit he purchased and reviewed with him how to use on his scars. 18 patient reports he burned the tip of his thumb a couple of days ago and was not aware of it. Does have a approx 1 cm area with evidence of a blister. No signs of infection noted today. He also wants to be able to  his thermal cup, web space is too tight to do so currently. 18 Reports today that he has history of left shoulder stiffness and had an incident of feeling pain in area of pec when doing his theraband. He reports he is starting to get sensation back in his index. Thumb is still numb. 6 weeks post op on 18. Will add greater active functional use to plan. Complains that left shoulder is getting sore. 1/10/17 Reports he saw md is to follow up again on  18 and will likely wean out of orthosis at that time. Background/Relevant Medical & Therapy History:  Was using a tape gun to wrap Sera presents, tape broke and momentum of the movement cut his radial, volar wrist. Lacerated 40% of median N, FDS to index, FPL thumb.  Observations (including splints, bandages, incisions, scars): took down post op dressing, no signs of complications, sutures intact      OBJECTIVE:   Date:  Hand Dominance:     [x]  Right    [] Left 2017 12/22/17   1/10/18  1/31/18  2/14/18  2/21/18    Objective Measures:          PAIN 4/10  2/10  010    Quick DASH 75%    57%    Digit  ROM         Index   /80  /97/50 MP:  PIP:  DIP:  /75  /90  /60                                 Long MP:  PIP:  DIP                                   Ring MP:  PIP:  DIP                                   Small MP:  PIP:  DIP        Digits tip to DPFC in cm Index: 6  Lon  Rin  Small:5 All fingers touch palm       Thumb ROM 50% flex of both joints MP: UE control with self care, reaching, carrying, lifting, house/yardwork, driving/computer work    Manual Treatments: scar mobs, forearm mobs-cupping to volar, distal forearm and scars   [x] ([phone]) Provided manual therapy to mobilize soft tissue/joints of the UE for the purpose of modulating pain, promoting relaxation,  increasing ROM, reducing/eliminating soft tissue swelling/inflammation/restriction, improving soft tissue extensibility and allowing for proper ROM for normal function with self care, reaching, carrying, lifting, house/yardwork, driving/computer work    Splinting:  [] Fabrication of: L-code  [] (05223) Checkout for orthotic/prosthetic use, established patient   [x] (91767) Orthotic management and training (fitting and assessment)-made a static web space stretch orthosis-to use at home 30 min 3x/day  [] Comments:12/20/17  Adjusted orthosis    Charges:  Timed Code Treatment Minutes: 50   Total Treatment Minutes: 50   [] EVAL (LOW) 20902   [] OT Re-eval (51429)  [] EVAL (MOD) 45994   [] EVAL (HIGH) 17250       [x] Daniel (50743) x  1   [] PMFZG(93563)  [] NMR (17307) x      [] Estim (attended) (19592)   [x] Manual (71970) x  1    [x] US (15711)  [] TA (16614) x      [] Paraffin (26790)  [] ADL  (13056) x     [] Splint/L code: Dorsal block    [] Estim (unattended) (80071)  [] Other:  [](53829) Checkout for orthotic use, established patient x       [x] (21123) Orthotic mgmt & training  x  1     GOALS:Short Term Goals: To be achieved in: 2 weeks  1. Independent in HEP and progression per patient tolerance, in order to prevent re-injury.    2. Patient will have a decrease in pain to facilitate improvement in movement, function, and ADLs as indicated by Functional Deficits.     Long Term Goals to be achieved in 8  weeks, including patient directed goals to address identified performance deficits:  1) Pt to be independent in graded HEP progression with a good level of effort and compliance.-ongoing  2) Pt to report a score of 30 % or less on the Quick DASH disability questionnaire for increased performance with carrying, moving, and handling objects.-improved but not met  3) Pt will demonstrate increased ROM to fingertips touching palm, thumb flex to small finger for improved ability to return to work at [employer], complete desired self care.-met  4) Pt will demonstrate increased strength to  50% of right for improved ability to  golf club, resume job duties. -not met  5) Pt will have a decrease in pain to 2/10 to facilitate improvement in ability to return to work at [employer], complete desired self care.-met     Progression Towards Functional goals:  [x] Patient is progressing as expected towards functional goals listed. [] Progression is slowed due to complexities listed. [] Progression has been slowed due to co-morbidities. [] Plan just implemented, too soon to assess goals progression  [x] Other: is functionally limited in use of hand by nubmness in part of median nerve distribution of left hand    ASSESSMENT:  Still making excellent progress, limited by numbness, first web space has improved motion, burn at tip of thumb is slow to heal. Tip protector should help. Treatment/Activity Tolerance:  [x] Patient tolerated treatment well [] Patient limited by fatique  [] Patient limited by pain  [] Patient limited by other medical complications  [] Other:     Prognosis: [x] Good [] Fair  [] Poor    Patient Requires Follow-up: [x] Yes  [] No    PLAN: Recommend Occupational Therapy, continue 1x per week for 4 additional weeks     [x] Continue per plan of care [] Alter current plan (see comments)  [] Plan of care initiated [] Hold pending MD visit [] Discharge    Plan for next session:  Strengthen progressively, add more functional activities with left hand, is tolerating gradual progression of ROM and functional activities.    Thermal modalities, functional activities and strengthening as inicated, AROM, PROM as

## 2018-03-01 ENCOUNTER — HOSPITAL ENCOUNTER (OUTPATIENT)
Dept: OCCUPATIONAL THERAPY | Age: 63
Discharge: HOME | End: 2018-03-31
Attending: ORTHOPAEDIC SURGERY | Admitting: ORTHOPAEDIC SURGERY

## 2018-03-26 ENCOUNTER — OFFICE VISIT (OUTPATIENT)
Dept: ORTHOPEDIC SURGERY | Age: 63
End: 2018-03-26

## 2018-03-26 DIAGNOSIS — S61.512S: Primary | ICD-10-CM

## 2018-03-26 DIAGNOSIS — S66.922S: Primary | ICD-10-CM

## 2018-03-26 PROCEDURE — 99024 POSTOP FOLLOW-UP VISIT: CPT | Performed by: ORTHOPAEDIC SURGERY

## 2018-09-24 ENCOUNTER — OFFICE VISIT (OUTPATIENT)
Dept: ORTHOPEDIC SURGERY | Age: 63
End: 2018-09-24
Payer: COMMERCIAL

## 2018-09-24 VITALS — HEIGHT: 72 IN | BODY MASS INDEX: 29.12 KG/M2 | WEIGHT: 215 LBS

## 2018-09-24 DIAGNOSIS — S66.922S: Primary | ICD-10-CM

## 2018-09-24 DIAGNOSIS — S61.502S: Primary | ICD-10-CM

## 2018-09-24 PROCEDURE — 99213 OFFICE O/P EST LOW 20 MIN: CPT | Performed by: ORTHOPAEDIC SURGERY

## 2018-09-24 RX ORDER — BIMATOPROST 0.01 %
DROPS OPHTHALMIC (EYE)
COMMUNITY
Start: 2018-08-23

## 2018-09-24 NOTE — PROGRESS NOTES
Assessment: Excellent tendon function her in after repair of multiple flexor tendons from a volar wrist laceration. Median nerve intrinsic motor function has recovered nicely he still has protective sensation but absent 2 point discrimination in thumb index and middle fingers. Treatment Plan: I instructed him on instrument intrinsic strengthening exercises to help him with this uncoordinated motion that he has with his thumb but overall I think he's progressing fairly well at 9 months post repair    Return in about 6 months (around 3/24/2019). History of Present Illness  Tee Ceron is a 58 y.o. male. Location:  Left volar radial wrist Severity: Has minimal pain he feels like he has slightly uncoordinated motion with his thumb and he does have a slight clicking sensation on palmar abduction Duration: Repair was December of last year  Modifying factors: Doing exercises Associated symptoms: He still feels decreased sensation in the median nerve distribution    Review of Systems  Pertinent items are noted in HPI  Denies fever chills, confusion and bowel and bladder active change  Complete Review of Systems reviewed from patient history form dated 13 0 and available in the patients chart under the media tab. Vital Signs  Vitals:    09/24/18 0840   Weight: 215 lb (97.5 kg)   Height: 6' (1.829 m)     Body mass index is 29.16 kg/m². Medical History  No past medical history on file. Current Outpatient Prescriptions on File Prior to Visit   Medication Sig Dispense Refill    gabapentin (NEURONTIN) 300 MG capsule       lisinopril-hydrochlorothiazide (PRINZIDE;ZESTORETIC) 10-12.5 MG per tablet       metFORMIN (GLUCOPHAGE) 500 MG tablet        No current facility-administered medications on file prior to visit.       No Known Allergies  [unfilled]  Family History   Problem Relation Age of Onset    Cancer Father        Physical Exam  Constitutional:  Patient is well-nourished and demonstrates normal hygiene. Mental Status:  Patient is alert and oriented to person, place and time. Skin:  Intact, no rashes or lesions. Hand Examination: FPL FDP to index flexor carpi radialis all appear intact against resistance with full range of motion. 2 point discrimination is still absent in the thumb index and middle fingers but he does have protective sensation and can easily tell when I'm touching him with a 2 point discrimination her's. The intrinsic motor function to his thenar eminences return nicely  I can see the slight popping on palmar abduction of the thumb but it's difficult to tell what exactly is causing it there is no subluxation of the [facility] joint it may be the EPL tendon subluxing to the ulnar side of the MP joint perhaps due to intrinsic muscle changes with the thenar eminence. He has good bulk however of his abductor pollicis brevis and can abduct his palm of his thumb away from the palm fairly nicely    Additional Comments:     Additional Examinations:  X-Ray Findings:    Additional Diagnostic Test Findings:    Office Procedures: I did instruct him on her on some intrinsic strengthening exercises as I think this may help with this uncoordinated motion that he feels    I spent 15 minutes, face to face, with the patient discussing treatment options and answering questions regarding . .. This dictation was performed with a verbal recognition program. It is possible that there are still dictated errors within this office note. All efforts were made to ensure that this office note is accurate. No orders of the defined types were placed in this encounter.

## 2019-03-18 ENCOUNTER — OFFICE VISIT (OUTPATIENT)
Dept: ORTHOPEDIC SURGERY | Age: 64
End: 2019-03-18
Payer: MEDICARE

## 2019-03-18 VITALS — HEIGHT: 72 IN | BODY MASS INDEX: 29.11 KG/M2 | WEIGHT: 214.95 LBS | RESPIRATION RATE: 13 BRPM

## 2019-03-18 DIAGNOSIS — S66.922S: Primary | ICD-10-CM

## 2019-03-18 DIAGNOSIS — R20.0 NUMBNESS AND TINGLING: ICD-10-CM

## 2019-03-18 DIAGNOSIS — R20.2 NUMBNESS AND TINGLING: ICD-10-CM

## 2019-03-18 DIAGNOSIS — S61.502S: Primary | ICD-10-CM

## 2019-03-18 PROCEDURE — 3017F COLORECTAL CA SCREEN DOC REV: CPT | Performed by: ORTHOPAEDIC SURGERY

## 2019-03-18 PROCEDURE — 1036F TOBACCO NON-USER: CPT | Performed by: ORTHOPAEDIC SURGERY

## 2019-03-18 PROCEDURE — 1101F PT FALLS ASSESS-DOCD LE1/YR: CPT | Performed by: ORTHOPAEDIC SURGERY

## 2019-03-18 PROCEDURE — 99213 OFFICE O/P EST LOW 20 MIN: CPT | Performed by: ORTHOPAEDIC SURGERY

## 2019-03-18 PROCEDURE — G8484 FLU IMMUNIZE NO ADMIN: HCPCS | Performed by: ORTHOPAEDIC SURGERY

## 2019-03-18 PROCEDURE — G8417 CALC BMI ABV UP PARAM F/U: HCPCS | Performed by: ORTHOPAEDIC SURGERY

## 2019-03-18 PROCEDURE — G8427 DOCREV CUR MEDS BY ELIG CLIN: HCPCS | Performed by: ORTHOPAEDIC SURGERY

## 2019-03-18 PROCEDURE — 1123F ACP DISCUSS/DSCN MKR DOCD: CPT | Performed by: ORTHOPAEDIC SURGERY

## 2019-03-18 PROCEDURE — 4040F PNEUMOC VAC/ADMIN/RCVD: CPT | Performed by: ORTHOPAEDIC SURGERY

## 2019-09-23 ENCOUNTER — OFFICE VISIT (OUTPATIENT)
Dept: ORTHOPEDIC SURGERY | Age: 64
End: 2019-09-23
Payer: MEDICARE

## 2019-09-23 VITALS — WEIGHT: 214.95 LBS | BODY MASS INDEX: 29.11 KG/M2 | RESPIRATION RATE: 17 BRPM | HEIGHT: 72 IN

## 2019-09-23 DIAGNOSIS — S61.502S: Primary | ICD-10-CM

## 2019-09-23 DIAGNOSIS — R20.2 NUMBNESS AND TINGLING: ICD-10-CM

## 2019-09-23 DIAGNOSIS — R20.0 NUMBNESS AND TINGLING: ICD-10-CM

## 2019-09-23 DIAGNOSIS — S66.922S: Primary | ICD-10-CM

## 2019-09-23 PROCEDURE — G8417 CALC BMI ABV UP PARAM F/U: HCPCS | Performed by: ORTHOPAEDIC SURGERY

## 2019-09-23 PROCEDURE — 3017F COLORECTAL CA SCREEN DOC REV: CPT | Performed by: ORTHOPAEDIC SURGERY

## 2019-09-23 PROCEDURE — 4040F PNEUMOC VAC/ADMIN/RCVD: CPT | Performed by: ORTHOPAEDIC SURGERY

## 2019-09-23 PROCEDURE — 1123F ACP DISCUSS/DSCN MKR DOCD: CPT | Performed by: ORTHOPAEDIC SURGERY

## 2019-09-23 PROCEDURE — 99213 OFFICE O/P EST LOW 20 MIN: CPT | Performed by: ORTHOPAEDIC SURGERY

## 2019-09-23 PROCEDURE — 1036F TOBACCO NON-USER: CPT | Performed by: ORTHOPAEDIC SURGERY

## 2019-09-23 PROCEDURE — G8427 DOCREV CUR MEDS BY ELIG CLIN: HCPCS | Performed by: ORTHOPAEDIC SURGERY

## 2019-09-23 NOTE — PROGRESS NOTES
Assessment: Substantial improvement in sensation almost 2 years post repair of the median nerve and multiple flexor tendons after severe injury which actually occurred from a tape gun cutting unit    Treatment Plan: He now has two-point discrimination radially and ulnarly in index finger and middle finger he can almost tell 2 points versus one in the radial aspect of the thumb but the ulnar aspect of the thumb still does not have two-point discrimination. His flexor tendon function is now completely normal    No follow-ups on file. History of Present Illness  Austin Santo is a 71 y.o. male. He is back for a follow up for his left hand/ wrist. Back in December of 2017 he had multiple flexor tendon repairs. He states that he has more sensation that the last time he was seen in March. Location:  Left hand Severity: some tingling, cold sensaion Duration: December 2017  Modifying factors: one Associated symptoms: associated numbness and tingling. Review of Systems  Pertinent items are noted in HPI  Denies fever chills, confusion and bowel and bladder active change  Complete Review of Systems reviewed from patient history form dated 03/18/2019 and available in the patients chart under the media tab. Vital Signs  Vitals:    09/23/19 0847   Resp: 17   Weight: 214 lb 15.2 oz (97.5 kg)   Height: 6' 0.01\" (1.829 m)     Body mass index is 29.15 kg/m². Contributory History  None    Medical History  History reviewed. No pertinent past medical history. Current Outpatient Medications on File Prior to Visit   Medication Sig Dispense Refill    LUMIGAN 0.01 % SOLN ophthalmic drops       gabapentin (NEURONTIN) 300 MG capsule       lisinopril-hydrochlorothiazide (PRINZIDE;ZESTORETIC) 10-12.5 MG per tablet       metFORMIN (GLUCOPHAGE) 500 MG tablet        No current facility-administered medications on file prior to visit.       No Known Allergies  Social History     Socioeconomic History    Marital status: